# Patient Record
Sex: MALE | Race: WHITE | NOT HISPANIC OR LATINO | Employment: FULL TIME | URBAN - METROPOLITAN AREA
[De-identification: names, ages, dates, MRNs, and addresses within clinical notes are randomized per-mention and may not be internally consistent; named-entity substitution may affect disease eponyms.]

---

## 2021-05-26 ENCOUNTER — RECORDS - HEALTHEAST (OUTPATIENT)
Dept: ADMINISTRATIVE | Facility: CLINIC | Age: 66
End: 2021-05-26

## 2022-02-15 ENCOUNTER — TELEPHONE (OUTPATIENT)
Dept: FAMILY MEDICINE | Facility: CLINIC | Age: 67
End: 2022-02-15

## 2022-02-15 ENCOUNTER — OFFICE VISIT (OUTPATIENT)
Dept: FAMILY MEDICINE | Facility: CLINIC | Age: 67
End: 2022-02-15
Payer: COMMERCIAL

## 2022-02-15 ENCOUNTER — ANCILLARY PROCEDURE (OUTPATIENT)
Dept: GENERAL RADIOLOGY | Facility: CLINIC | Age: 67
End: 2022-02-15
Attending: NURSE PRACTITIONER
Payer: COMMERCIAL

## 2022-02-15 VITALS
RESPIRATION RATE: 16 BRPM | TEMPERATURE: 96.8 F | SYSTOLIC BLOOD PRESSURE: 122 MMHG | BODY MASS INDEX: 23.52 KG/M2 | HEART RATE: 66 BPM | DIASTOLIC BLOOD PRESSURE: 62 MMHG | WEIGHT: 168 LBS | HEIGHT: 71 IN | OXYGEN SATURATION: 100 %

## 2022-02-15 DIAGNOSIS — T14.8XXA SUPERFICIAL FOREIGN BODY (SLIVER): Primary | ICD-10-CM

## 2022-02-15 DIAGNOSIS — T14.8XXA SUPERFICIAL FOREIGN BODY (SLIVER): ICD-10-CM

## 2022-02-15 DIAGNOSIS — S60.459A INFECTED SLIVER OF SKIN OF FINGER, INITIAL ENCOUNTER: ICD-10-CM

## 2022-02-15 DIAGNOSIS — L08.9 INFECTED SLIVER OF SKIN OF FINGER, INITIAL ENCOUNTER: ICD-10-CM

## 2022-02-15 PROCEDURE — 73120 X-RAY EXAM OF HAND: CPT | Mod: LT | Performed by: RADIOLOGY

## 2022-02-15 PROCEDURE — 99203 OFFICE O/P NEW LOW 30 MIN: CPT | Performed by: NURSE PRACTITIONER

## 2022-02-15 RX ORDER — CEPHALEXIN 500 MG/1
500 CAPSULE ORAL 3 TIMES DAILY
Qty: 30 CAPSULE | Refills: 0 | Status: SHIPPED | OUTPATIENT
Start: 2022-02-15 | End: 2022-02-25

## 2022-02-15 ASSESSMENT — MIFFLIN-ST. JEOR: SCORE: 1564.17

## 2022-02-15 ASSESSMENT — PAIN SCALES - GENERAL: PAINLEVEL: NO PAIN (0)

## 2022-02-15 NOTE — PATIENT INSTRUCTIONS
Patient Education     Foreign Object Under the Skin (Not Removed)  Very small particles that remain under the skin usually don t cause problems or need further treatment. But sometimes they can cause an infection. Sometimes they work their way to the surface on their own without any problems. If you see this happening, you can remove any particles with tweezers. Be careful not to dig up the skin and make things worse.   You may need to see a surgeon if the object is large and couldn't be removed.. The surgeon can assess the injury and treat it. Sometimes a surgeon uses X-rays or ultrasound to guide them in removing the object.   Home care  Wound care    Keep the wound clean and dry.    If there is a dressing or bandage, change it when it gets wet or dirty. Otherwise, leave it on for the first 24 hours, then change it once a day or as often as you were instructed.    If stitches or staples were used, clean the wound every day:  ? After taking off the dressing, wash the area gently with soap and water.  ? Apply a thin layer of antibiotic ointment to the cut. This will keep the wound clean and make it easier to remove the stitches. If it is oozing a lot, you can put a nonstick dressing over it. Then reapply the bandage or dressing as you were instructed.  ? You can get it wet, just like when you clean it. This means you can shower as usual for the first 24 hours. But don't soak the area in water (no baths or swimming) until the stitches or staples are taken out.    If surgical tape or strips were used, keep the area clean and dry. If it becomes wet, blot it dry with a towel.  Medicine    You can take over-the-counter medicine for pain, unless you were given a different pain medicine to use. Talk with your healthcare provider before using these medicines if you have chronic liver or kidney disease, ever had a stomach ulcer or digestive bleeding, or are taking blood-thinner medicines.    If you were given antibiotics,  take them until they are used up. It's important to finish the antibiotics even if the wound looks better to make sure the infection clears.    Follow-up care  Follow up your healthcare provider, or as advised. Keep in mind the following:     Watch for any signs of infection, such as increasing pain, redness, swelling, or pus drainage. If this happens, don t wait for your scheduled visit. See your healthcare provider sooner.    Stitches or staples are usually taken out within 5 to 14 days. This varies depending on what part of your body they are on, and the type of wound. The healthcare provider will tell you how long they should be left in.    If surgical tape or strips were used, they are usually left on for 7 to 10 days. You can remove them after that unless you were told otherwise. If you try to remove them, and it is too difficult, soaking can help. If the edges of the cut pull apart, then stop removing the tape, and follow up with your healthcare provider.    If X-rays were taken, you will be told if there are new findings that may affect your care.  When to seek medical advice  Call your healthcare provider right away if any of these occur:     Fever of 100.4 F (38 C) or higher, or as directed by your healthcare provider    Increasing pain in the wound    Redness, swelling or pus coming from the wound  [employer] last reviewed this educational content on 8/1/2019 2000-2021 [employer]. All rights reserved. This information is not intended as a substitute for professional medical care. Always follow your healthcare professional's instructions.

## 2022-02-15 NOTE — TELEPHONE ENCOUNTER
Pt has had a sliver in his hand since Saturday, has tried soaking it, cannot get sliver out. Area does not look infected per pt. Clinic appt made for this afternoon. Greer Moreira RN

## 2022-02-15 NOTE — PROGRESS NOTES
"  Assessment & Plan     Superficial foreign body (sliver)  The risks, benefits and treatment options of prescribed medications or other treatments have been discussed with the patient. The patient verbalized their understanding and should call or follow up if no improvement or if they develop further problems.        - cephALEXin (KEFLEX) 500 MG capsule  Dispense: 30 capsule; Refill: 0  - Adult General Surg Referral    Infected sliver of skin of finger, initial encounter    Discussed soaks twice daily.  If not improved - recommend follow up in clinic or General surgery for removal if suspected sliver still present.          See Patient Instructions    No follow-ups on file.    Lucy Brizuela NP  [facility]    Klaudia Shafer is a 66 year old who presents for the following health issues     History of Present Illness       He eats 0-1 servings of fruits and vegetables daily.He consumes 0 sweetened beverage(s) daily.He exercises with enough effort to increase his heart rate 60 or more minutes per day.  He exercises with enough effort to increase his heart rate 5 days per week.   He is taking medications regularly.       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      Objective    /62 (BP Location: Right arm, Patient Position: Chair, Cuff Size: Adult Regular)   Pulse 66   Temp 96.8  F (36  C) (Tympanic)   Resp 16   Ht 1.803 m (5' 11\")   Wt 76.2 kg (168 lb)   SpO2 100%   " BMI 23.43 kg/m    Body mass index is 23.43 kg/m .  Physical Exam   GENERAL: healthy, alert and no distress  SKIN: Left hand outer lateral hand with site of sliver entrance with mild erythema and swelling.  Using tweeze and small blade at site.  Attempt to remove sliver was unsuccessful.

## 2022-03-27 ENCOUNTER — HEALTH MAINTENANCE LETTER (OUTPATIENT)
Age: 67
End: 2022-03-27

## 2022-09-25 ENCOUNTER — HEALTH MAINTENANCE LETTER (OUTPATIENT)
Age: 67
End: 2022-09-25

## 2022-11-17 ENCOUNTER — APPOINTMENT (OUTPATIENT)
Dept: GENERAL RADIOLOGY | Facility: CLINIC | Age: 67
End: 2022-11-17
Attending: PHYSICIAN ASSISTANT
Payer: COMMERCIAL

## 2022-11-17 ENCOUNTER — HOSPITAL ENCOUNTER (EMERGENCY)
Facility: CLINIC | Age: 67
Discharge: HOME | End: 2022-11-17
Attending: PHYSICIAN ASSISTANT | Admitting: PHYSICIAN ASSISTANT
Payer: COMMERCIAL

## 2022-11-17 VITALS
RESPIRATION RATE: 18 BRPM | OXYGEN SATURATION: 98 % | DIASTOLIC BLOOD PRESSURE: 39 MMHG | HEART RATE: 61 BPM | TEMPERATURE: 97.2 F | SYSTOLIC BLOOD PRESSURE: 124 MMHG

## 2022-11-17 DIAGNOSIS — M79.642 PAIN OF LEFT HAND: ICD-10-CM

## 2022-11-17 PROCEDURE — G0463 HOSPITAL OUTPT CLINIC VISIT: HCPCS | Performed by: PHYSICIAN ASSISTANT

## 2022-11-17 PROCEDURE — 73130 X-RAY EXAM OF HAND: CPT | Mod: LT

## 2022-11-17 PROCEDURE — 99213 OFFICE O/P EST LOW 20 MIN: CPT | Performed by: PHYSICIAN ASSISTANT

## 2022-11-17 RX ORDER — PREDNISONE 20 MG/1
TABLET ORAL
Qty: 10 TABLET | Refills: 0 | Status: SHIPPED | OUTPATIENT
Start: 2022-11-17 | End: 2023-04-25

## 2022-11-17 RX ORDER — CEPHALEXIN 500 MG/1
500 CAPSULE ORAL 4 TIMES DAILY
Qty: 28 CAPSULE | Refills: 0 | Status: SHIPPED | OUTPATIENT
Start: 2022-11-17 | End: 2022-11-24

## 2022-11-17 ASSESSMENT — ENCOUNTER SYMPTOMS
ARTHRALGIAS: 1
NEUROLOGICAL NEGATIVE: 1
JOINT SWELLING: 0

## 2022-11-17 NOTE — ED TRIAGE NOTES
Pt reports falling yesterday on landing on left hand causing thumb pain. States did not hit head

## 2022-11-17 NOTE — ED PROVIDER NOTES
History     Chief Complaint   Patient presents with     Hand Injury     HPI  Hollis Chapin is a 67 year old male who presents for evaluation of pain in the left hand, specifically at the base of the left thumb after a fall which occurred yesterday.  States that he was carrying some bagged items and slipped, breaking his fall with an outstretched left hand.  States that his left thumb had primarily hit the ground after which she developed pain in the first MCP joint.  He has been icing the affected area last night and this morning with limited relief of pain.  Hurts when moving his thumb especially in the MCP joint.  Has no reduction in strength or sensation in the left hand.  He denies any other trauma such as to the head or neck, left upper extremity, right upper extremity or lower extremities.  He is concerned that he may have broken a bone in his left hand. No history of gout or joint infections.    Allergies:  No Known Allergies    Problem List:    Patient Active Problem List    Diagnosis Date Noted     Wrist Sprain      Priority: Medium     Created by Conversion  Replacement Utility updated for latest IMO load         Abdominal Pain      Priority: Medium     Created by Conversion  [facility] Annotation: Apr 17 2013  4:15PM - Nain May: Left   upper  Replacement Utility updated for latest IMO load         Diverticulosis      Priority: Medium     Created by Conversion  Replacement Utility updated for latest IMO load            Past Medical History:    No past medical history on file.    Past Surgical History:    Past Surgical History:   Procedure Laterality Date     HC REMOVE TONSILS/ADENOIDS,<11 Y/O      Description: Tonsillectomy With Adenoidectomy;  Recorded: 05/20/2010;     LA LAP,INGUINAL HERNIA REPR,INITIAL      Description: Laparoscopy Repair Of Initial Inguinal Hernia;  Recorded: 05/20/2010;       Family History:    No family history on file.    Social History:  Marital Status:    [2]  Social History     Tobacco Use     Smoking status: Former     Smokeless tobacco: Never   Vaping Use     Vaping Use: Never used   Substance Use Topics     Alcohol use: Not Currently     Drug use: Not Currently        Medications:    cephALEXin (KEFLEX) 500 MG capsule  predniSONE (DELTASONE) 20 MG tablet          Review of Systems   Musculoskeletal: Positive for arthralgias. Negative for joint swelling.   Skin: Negative.    Neurological: Negative.        Physical Exam   BP: (!) 124/39  Pulse: 61  Temp: 97.2  F (36.2  C)  Resp: 18  SpO2: 98 %      Physical Exam  Constitutional:       General: He is not in acute distress.     Appearance: Normal appearance. He is not ill-appearing, toxic-appearing or diaphoretic.   Cardiovascular:      Pulses: Normal pulses.           Radial pulses are 2+ on the left side.   Musculoskeletal:         General: No swelling.      Left hand: Tenderness present. No swelling, deformity, lacerations or bony tenderness. Decreased range of motion. Normal strength. Normal sensation. Normal capillary refill. Normal pulse.      Cervical back: No muscular tenderness.      Comments: Decreased range of motion and opposition as well as in full extension and abduction likely due to pain and mild swelling in the first MCP joints, left hand.  Mild tenderness to palpation over the dorsal aspect of the first MCP joints, left hand.   Skin:     General: Skin is warm and dry.      Findings: Erythema present. No laceration, lesion or rash.      Comments: Dorsal aspect of the left hand over the MCP joint is mildly erythematous and mildly warm to touch.   Neurological:      Mental Status: He is alert and oriented to person, place, and time.      Sensory: No sensory deficit.         ED Course                 Procedures                  No results found for this or any previous visit (from the past 24 hour(s)).    Medications - No data to display    Assessments & Plan (with Medical Decision Making)     X-rays of  the left hand showed no evidence for acute fracture or dislocation, no degenerative changes.    Differential diagnosis includes fracture versus gout versus osteoarthritis flare versus localized infection.  However, the patient has no fractures or degenerative changes on x-ray.  No history of gout.    The patient's left hand has some redness and warmth over the MCP joint.  I am concerned about the potential for a localized infection today versus localized inflammation.  Given concern for localized infection, starting oral Keflex today.  Provided him with a short course of prednisone as well due to concern for localized inflammation.  He has no red streaks progressing up into the left forearm or fevers to suggest a worsening infection today.  Appropriate for oral antibiotics.    Recommend avoiding ibuprofen while taking prednisone due to concern for excess GI irritation.  Take prednisone with food.    Discussed return precautions including worsening signs and symptoms that would warrant immediate medical evaluation.    You should see improvement in symptoms in 24 to 48 hours after starting antibiotic. Return to clinic if symptoms worsen or persist for more than 48 hours.     Recommend taking a probiotic at the same time you are on antibiotic. Probiotic supports and maintains digestive health while taking antibiotic.      I have reviewed the nursing notes.    I have reviewed the findings, diagnosis, plan and need for follow up with the patient.      Discharge Medication List as of 11/17/2022  2:41 PM      START taking these medications    Details   cephALEXin (KEFLEX) 500 MG capsule Take 1 capsule (500 mg) by mouth 4 times daily for 7 days, Disp-28 capsule, R-0, E-Prescribe      predniSONE (DELTASONE) 20 MG tablet Take two tablets (= 40mg) each day for 5 (five) days, Disp-10 tablet, R-0, E-Prescribe             Final diagnoses:   Pain of left hand       11/17/2022   [facility] EMERGENCY DEPT      Erickson Saeed PA-C  11/20/22 0835

## 2022-11-17 NOTE — DISCHARGE INSTRUCTIONS
Recommend avoiding ibuprofen while taking prednisone due to concern for excess GI irritation.  Take prednisone with food.    Return to clinic for further evaluation and 3 days if you have no improvement.    Recommend urgent medical evaluation if you develop worsening pain, pain out of proportion to injury, numbness or tingling or loss of feeling in the left hand, worsening redness/tenderness/swelling or red streaks progressing up into the left forearm.    You should see improvement in symptoms in 24 to 48 hours after starting antibiotic. Return to clinic if symptoms worsen or persist for more than 48 hours.     Recommend taking a probiotic at the same time you are on antibiotic. Probiotic supports and maintains digestive health while taking antibiotic.

## 2023-04-25 ENCOUNTER — OFFICE VISIT (OUTPATIENT)
Dept: FAMILY MEDICINE | Facility: CLINIC | Age: 68
End: 2023-04-25
Payer: COMMERCIAL

## 2023-04-25 ENCOUNTER — NURSE TRIAGE (OUTPATIENT)
Dept: NURSING | Facility: CLINIC | Age: 68
End: 2023-04-25

## 2023-04-25 VITALS
HEIGHT: 71 IN | RESPIRATION RATE: 16 BRPM | DIASTOLIC BLOOD PRESSURE: 65 MMHG | TEMPERATURE: 98.8 F | OXYGEN SATURATION: 96 % | SYSTOLIC BLOOD PRESSURE: 121 MMHG | HEART RATE: 66 BPM | WEIGHT: 177.6 LBS | BODY MASS INDEX: 24.86 KG/M2

## 2023-04-25 DIAGNOSIS — R51.9 ACUTE NONINTRACTABLE HEADACHE, UNSPECIFIED HEADACHE TYPE: Primary | ICD-10-CM

## 2023-04-25 LAB
ALBUMIN SERPL BCG-MCNC: 4.4 G/DL (ref 3.5–5.2)
ALP SERPL-CCNC: 69 U/L (ref 40–129)
ALT SERPL W P-5'-P-CCNC: 19 U/L (ref 10–50)
ANION GAP SERPL CALCULATED.3IONS-SCNC: 10 MMOL/L (ref 7–15)
AST SERPL W P-5'-P-CCNC: 25 U/L (ref 10–50)
BILIRUB DIRECT SERPL-MCNC: <0.2 MG/DL (ref 0–0.3)
BILIRUB SERPL-MCNC: 0.3 MG/DL
BUN SERPL-MCNC: 17.6 MG/DL (ref 8–23)
CALCIUM SERPL-MCNC: 9.6 MG/DL (ref 8.8–10.2)
CHLORIDE SERPL-SCNC: 104 MMOL/L (ref 98–107)
CREAT SERPL-MCNC: 1.05 MG/DL (ref 0.67–1.17)
CRP SERPL-MCNC: <3 MG/L
DEPRECATED HCO3 PLAS-SCNC: 26 MMOL/L (ref 22–29)
ERYTHROCYTE [DISTWIDTH] IN BLOOD BY AUTOMATED COUNT: 11.9 % (ref 10–15)
ERYTHROCYTE [SEDIMENTATION RATE] IN BLOOD BY WESTERGREN METHOD: 7 MM/HR (ref 0–20)
GFR SERPL CREATININE-BSD FRML MDRD: 78 ML/MIN/1.73M2
GLUCOSE SERPL-MCNC: 89 MG/DL (ref 70–99)
HCT VFR BLD AUTO: 41.8 % (ref 40–53)
HGB BLD-MCNC: 14.4 G/DL (ref 13.3–17.7)
MCH RBC QN AUTO: 34.2 PG (ref 26.5–33)
MCHC RBC AUTO-ENTMCNC: 34.4 G/DL (ref 31.5–36.5)
MCV RBC AUTO: 99 FL (ref 78–100)
PLATELET # BLD AUTO: 242 10E3/UL (ref 150–450)
POTASSIUM SERPL-SCNC: 4.1 MMOL/L (ref 3.4–5.3)
PROT SERPL-MCNC: 7.3 G/DL (ref 6.4–8.3)
RBC # BLD AUTO: 4.21 10E6/UL (ref 4.4–5.9)
SODIUM SERPL-SCNC: 140 MMOL/L (ref 136–145)
WBC # BLD AUTO: 6.1 10E3/UL (ref 4–11)

## 2023-04-25 PROCEDURE — 82248 BILIRUBIN DIRECT: CPT | Performed by: FAMILY MEDICINE

## 2023-04-25 PROCEDURE — 99214 OFFICE O/P EST MOD 30 MIN: CPT | Performed by: FAMILY MEDICINE

## 2023-04-25 PROCEDURE — 85027 COMPLETE CBC AUTOMATED: CPT | Performed by: FAMILY MEDICINE

## 2023-04-25 PROCEDURE — 36415 COLL VENOUS BLD VENIPUNCTURE: CPT | Performed by: FAMILY MEDICINE

## 2023-04-25 PROCEDURE — 85652 RBC SED RATE AUTOMATED: CPT | Performed by: FAMILY MEDICINE

## 2023-04-25 PROCEDURE — 86140 C-REACTIVE PROTEIN: CPT | Performed by: FAMILY MEDICINE

## 2023-04-25 PROCEDURE — 80053 COMPREHEN METABOLIC PANEL: CPT | Performed by: FAMILY MEDICINE

## 2023-04-25 RX ORDER — IBUPROFEN 600 MG/1
600 TABLET, FILM COATED ORAL EVERY 6 HOURS PRN
COMMUNITY
Start: 2022-12-12

## 2023-04-25 NOTE — PATIENT INSTRUCTIONS
Hollis,    We will check laboratory testing as we discussed  I recommend that you set up the CT scan of the head  Please let me know if you develop an unusual rash on your forehead or if symptoms worsen  If symptoms become severe go to the emergency department    Truong Salvador MD

## 2023-04-25 NOTE — PROGRESS NOTES
"  Assessment & Plan     Acute nonintractable headache, unspecified headache type    Tension headache versus migraine variant versus other    Given the severity and persistence of his headache will refer for a CT scan for further evaluation  Check laboratory testing including looking for inflammatory markers given the possibility of temporal arteritis  Recommend keeping a headache diary  Reviewed symptomatic treatment  He will be treated with a prednisone taper  Recommend follow-up if having persistent headaches  Reviewed warning signs recommend immediate follow-up if his symptoms should worsen    - CT Head w/o Contrast  - Basic metabolic panel  - CBC with platelets  - ESR: Erythrocyte sedimentation rate  - CRP inflammation  - Hepatic function panel  - Basic metabolic panel  - CBC with platelets  - ESR: Erythrocyte sedimentation rate  - CRP inflammation  - Hepatic function panel  - predniSONE (DELTASONE) 20 MG tablet  Dispense: 14 tablet; Refill: 0    Spent 30 minutes ankle time for chart preparation and review as well as with patient and in reviewing the plan    Review of external notes as documented elsewhere in note         BMI:   Estimated body mass index is 25.12 kg/m  as calculated from the following:    Height as of this encounter: 1.791 m (5' 10.5\").    Weight as of this encounter: 80.6 kg (177 lb 9.6 oz).           Truong Salvador MD  [facility]    Klaudia Shafer is a 67 year old, presenting for the following health issues:  head pain (C/o right sided face/head pain that also goes into eye. Started Saturday )    This is a 67-year-old male who presents to the clinic with a persistent headache.  He has had a headache over the past week that is more prominent on the right side of his head. This has localized tot he area behind his right eye. He denies visual changes or nausea. He has not had recent trauma. Denies an obvious infection and he has not had nasal congestion or " "post nasal drainage. He has had some discomfort when looking down. He is concerned as he will be traveling to [place] later this week.     Review of systems is negative for neck pain, chest pain, or shortness of breath.       Additional Questions   Roomed by Ana Lilia BRUNSON CMA     History of Present Illness       Headaches:   Since the patient's last clinic visit, headaches are: no change  The patient is getting headaches:  Usually none  He is able to do normal daily activities when he has a migraine.  The patient is taking the following rescue/relief medications:  Ibuprofen (Advil, Motrin)   Patient states \"I get some relief\" from the rescue/relief medications.   The patient is taking the following medications to prevent migraines:  No medications to prevent migraines  In the past 4 weeks, the patient has gone to an Urgent Care or Emergency Room 0 times times due to headaches.    He eats 0-1 servings of fruits and vegetables daily.He consumes 0 sweetened beverage(s) daily.He exercises with enough effort to increase his heart rate 60 or more minutes per day.  He exercises with enough effort to increase his heart rate 5 days per week.   He is taking medications regularly.               Review of Systems   Constitutional, HEENT, cardiovascular, pulmonary, GI, , musculoskeletal, neuro, skin, endocrine and psych systems are negative, except as otherwise noted.      Objective    /65 (BP Location: Left arm, Patient Position: Sitting, Cuff Size: Adult Large)   Pulse 66   Temp 98.8  F (37.1  C) (Oral)   Resp 16   Ht 1.791 m (5' 10.5\")   Wt 80.6 kg (177 lb 9.6 oz)   SpO2 96%   BMI 25.12 kg/m    Body mass index is 25.12 kg/m .  Physical Exam   GENERAL: healthy, alert and no distress  EYES: Eyes grossly normal to inspection, PERRL and conjunctivae and sclerae normal  HENT: ear canals and TM's normal, nose and mouth without ulcers or lesions  NECK: no adenopathy, no asymmetry, masses, or scars and thyroid normal to " palpation  RESP: lungs clear to auscultation - no rales, rhonchi or wheezes  CV: regular rate and rhythm, normal S1 S2, no S3 or S4, no murmur, click or rub, no peripheral edema and peripheral pulses strong  MS: no gross musculoskeletal defects noted, no edema  SKIN: no suspicious lesions or rashes  NEURO: Normal strength and tone, mentation intact and speech normal  PSYCH: mentation appears normal, affect normal/bright

## 2023-04-25 NOTE — TELEPHONE ENCOUNTER
"Pt calling re: headache.    Says he has had a HA since Saturday. Doesn't typically get headaches. No recent head injury.    States that it's on right side of his head and down by his eye feels \"tingly\" when he touches it. Says his R eye feels like like theres something in it and feels \"tired\", but denies any vision changes. Says movement of facial muscles is normal. Says its a constant 2/10, but when bad a 5/10. Took ibuprofen yesterday (800mg) which helped a little, but still has the HA today.     Protocol recommends see in office today. Pt has appt at 4:20 with PCP. Will plan to keep appt and call back if anything worsens. Pt verbalized understanding.    Gillian Costello RN, BSN  [facility]   Triage Nurse Advisor          Reason for Disposition    Patient wants to be seen    Additional Information    Negative: Difficult to awaken or acting confused (e.g., disoriented, slurred speech)    Negative: Weakness of the face, arm or leg on one side of the body and new-onset    Negative: Numbness of the face, arm or leg on one side of the body and new-onset    Negative: Loss of speech or garbled speech and new-onset    Negative: Passed out (i.e., fainted, collapsed and was not responding)    Negative: Sounds like a life-threatening emergency to the triager    Negative: Followed a head injury within last 3 days    Negative: Traumatic Brain Injury (TBI) is suspected    Negative: Sinus pain of forehead and yellow or green nasal discharge    Negative: Pregnant    Negative: Unable to walk without falling    Negative: Stiff neck (can't touch chin to chest)    Negative: SEVERE headache, states 'worst headache' of life    Negative: SEVERE headache, sudden-onset (i.e., reaching maximum intensity within seconds to 1 hour)    Negative: Severe pain in one eye    Negative: Loss of vision or double vision  (Exception: Same as prior migraines.)    Negative: Patient sounds very sick or weak to the triager    Negative: Fever > 103 F " (39.4 C)    Negative: Fever > 100.0 F (37.8 C) and has diabetes mellitus or a weak immune system (e.g., HIV positive, cancer chemotherapy, organ transplant, splenectomy, chronic steroids)    Negative: SEVERE headache (e.g., excruciating) and has had severe headaches before    Negative: SEVERE headache and not relieved by pain meds    Negative: SEVERE headache and vomiting    Negative: SEVERE headache and fever    Negative: Possibility of carbon monoxide exposure    Negative: New headache and weak immune system (e.g., HIV positive, cancer chemo, splenectomy, organ transplant, chronic steroids)    Negative: Fever present > 3 days (72 hours)    Protocols used: HEADACHE-A-[place]

## 2023-04-26 ENCOUNTER — TELEPHONE (OUTPATIENT)
Dept: FAMILY MEDICINE | Facility: CLINIC | Age: 68
End: 2023-04-26
Payer: COMMERCIAL

## 2023-04-26 ENCOUNTER — HOSPITAL ENCOUNTER (OUTPATIENT)
Dept: CT IMAGING | Facility: CLINIC | Age: 68
Discharge: HOME | End: 2023-04-26
Attending: FAMILY MEDICINE | Admitting: FAMILY MEDICINE
Payer: COMMERCIAL

## 2023-04-26 DIAGNOSIS — R51.9 ACUTE NONINTRACTABLE HEADACHE, UNSPECIFIED HEADACHE TYPE: ICD-10-CM

## 2023-04-26 PROCEDURE — 70450 CT HEAD/BRAIN W/O DYE: CPT

## 2023-04-26 RX ORDER — PREDNISONE 20 MG/1
TABLET ORAL
Qty: 14 TABLET | Refills: 0 | Status: SHIPPED | OUTPATIENT
Start: 2023-04-26

## 2023-04-26 NOTE — TELEPHONE ENCOUNTER
Hollis is calling to follow up regarding CT results. Scan done today. As of 3:44pm - note has not been dictated yet. Please call when you review. Thanks ANA PAULA!

## 2023-04-26 NOTE — TELEPHONE ENCOUNTER
I called and reviewed with patient.  His CT scan was unremarkable.  Laboratory testing also was normal.  We discussed options.  He will be treated with a course of prednisone.  Recommend follow-up with his eye doctor as well.  Patient agrees to plan.

## 2023-05-13 ENCOUNTER — HEALTH MAINTENANCE LETTER (OUTPATIENT)
Age: 68
End: 2023-05-13

## 2024-02-21 ENCOUNTER — HOSPITAL ENCOUNTER (EMERGENCY)
Facility: CLINIC | Age: 69
Discharge: HOME | End: 2024-02-21
Attending: PHYSICIAN ASSISTANT | Admitting: PHYSICIAN ASSISTANT
Payer: COMMERCIAL

## 2024-02-21 ENCOUNTER — APPOINTMENT (OUTPATIENT)
Dept: GENERAL RADIOLOGY | Facility: CLINIC | Age: 69
End: 2024-02-21
Attending: PHYSICIAN ASSISTANT
Payer: COMMERCIAL

## 2024-02-21 VITALS
DIASTOLIC BLOOD PRESSURE: 45 MMHG | TEMPERATURE: 98 F | SYSTOLIC BLOOD PRESSURE: 134 MMHG | OXYGEN SATURATION: 99 % | RESPIRATION RATE: 22 BRPM | HEART RATE: 53 BPM

## 2024-02-21 DIAGNOSIS — M79.641 PAIN OF RIGHT HAND: ICD-10-CM

## 2024-02-21 PROCEDURE — 73130 X-RAY EXAM OF HAND: CPT | Mod: RT

## 2024-02-21 PROCEDURE — 99213 OFFICE O/P EST LOW 20 MIN: CPT | Performed by: PHYSICIAN ASSISTANT

## 2024-02-21 PROCEDURE — G0463 HOSPITAL OUTPT CLINIC VISIT: HCPCS | Performed by: PHYSICIAN ASSISTANT

## 2024-02-21 ASSESSMENT — ENCOUNTER SYMPTOMS: CONSTITUTIONAL NEGATIVE: 1

## 2024-02-21 NOTE — ED PROVIDER NOTES
History     Chief Complaint   Patient presents with    Hand Pain     Right hand pain x's 1 week , patient states he hit his thumb with a hammer but is not sure if that is the cause of the pain.      HPI  Hollis Chapin is a 68 year old male who presents to Urgent Care with complaints of right hand pain for the past week.  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  He states he accidentally hit his right thumb with his hammer which initially caused the skin of the thumb to change colors however his thumb is not bothering him anymore.  He states he subsequently developed swelling and pain to his dorsal right hand that was quite significant over the weekend.  The swelling has since come down but patient continues to have pain throughout the dorsal right hand.  No overlying skin changes or redness.  Denies fevers or chills.  Patient is left-hand dominant.      Allergies:  No Known Allergies    Problem List:    Patient Active Problem List    Diagnosis Date Noted    Wrist Sprain      Priority: Medium     Created by Conversion  Replacement Utility updated for latest IMO load        Abdominal Pain      Priority: Medium     Created by [employer]  [facility] Annotation: Apr 17 2013  4:15PM - Nain May: Left   upper  Replacement Utility updated for latest IMO load        Diverticulosis      Priority: Medium     Created by Conversion  Replacement Utility updated for latest IMO load            Past Medical History:    No past medical history on file.    Past Surgical History:    Past Surgical History:   Procedure Laterality Date    HC REMOVE TONSILS/ADENOIDS,<13 Y/O      Description: Tonsillectomy With Adenoidectomy;  Recorded: 05/20/2010;    DE LAP,INGUINAL HERNIA REPR,INITIAL      Description: Laparoscopy Repair Of Initial Inguinal Hernia;  Recorded: 05/20/2010;       Family History:    No family history on  file.    Social History:  Marital Status:   [2]  Social History     Tobacco Use    Smoking status: Former    Smokeless tobacco: Never   Vaping Use    Vaping Use: Never used   Substance Use Topics    Alcohol use: Not Currently    Drug use: Not Currently        Medications:    ibuprofen (ADVIL/MOTRIN) 600 MG tablet  Multiple Vitamins-Minerals (MULTIVITAMIN ADULTS PO)  predniSONE (DELTASONE) 20 MG tablet          Review of Systems   Constitutional: Negative.    Musculoskeletal:         Right hand pain and swelling   Skin: Negative.    All other systems reviewed and are negative.      Physical Exam   BP: 134/45  Pulse: 53  Temp: 98  F (36.7  C)  Resp: 22  SpO2: 99 %      Physical Exam  Constitutional:       General: He is not in acute distress.     Appearance: Normal appearance. He is well-developed. He is not ill-appearing, toxic-appearing or diaphoretic.   HENT:      Head: Normocephalic and atraumatic.   Eyes:      Conjunctiva/sclera: Conjunctivae normal.   Cardiovascular:      Pulses: Normal pulses.   Pulmonary:      Effort: Pulmonary effort is normal.   Musculoskeletal:         General: Normal range of motion.      Right wrist: Normal. No swelling or bony tenderness. Normal range of motion.      Right hand: Swelling, tenderness and bony tenderness present. Normal range of motion. Normal strength. Normal sensation. There is no disruption of two-point discrimination. Normal capillary refill. Normal pulse.        Hands:       Cervical back: Neck supple.      Comments: Localized dorsal right hand tenderness.  No overlying skin changes or erythema.  Full range of motion of fingers and wrist without difficulties.  Small amount of localized swelling to dorsal right hand.  No breaks in the skin.   Skin:     General: Skin is warm and dry.      Capillary Refill: Capillary refill takes less than 2 seconds.   Neurological:      General: No focal deficit present.      Mental Status: He is alert.      Sensory: Sensation is  intact.      Motor: Motor function is intact.         ED Course                 Procedures      Results for orders placed or performed during the hospital encounter of 02/21/24 (from the past 24 hour(s))   XR Hand Right G/E 3 Views    Narrative    EXAM: XR HAND RIGHT G/E 3 VIEWS  DATE/TIME: 2/21/2024 12:48 PM    INDICATION: right dorsal hand pain and swelling  COMPARISON: None available.       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 No acute fracture. Retained metallic foreign body at the volar  aspect of the middle third of the fifth middle phalanx.       NABOR MCKEON DO         SYSTEM ID:  [id number]       Medications - No data to display    Assessments & Plan (with Medical Decision Making)     Pt is a 68 year old male who presents to Urgent Care with complaints of right hand pain for the past week.  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  He states he accidentally hit his right thumb with his hammer which initially caused the skin of the thumb to change colors however his thumb is not bothering him anymore.  He states he subsequently developed swelling and pain to his dorsal right hand that was quite significant over the weekend.  The swelling has since come down but patient continues to have pain throughout the dorsal right hand.      Pt is afebrile on arrival.  Exam as above.  X-rays of right hand show mild degenerative arthrosis.  No fracture or malalignment.  Discussed results with patient.  Encouraged symptomatic treatments at home of suspected overuse/tendinitis.  Return precautions were reviewed.  Hand-outs were provided.    Patient was instructed to follow-up with orthopedics as needed for continued care and management.  He is to return to the ED for persistent and/or worsening symptoms.  Patient expressed understanding of the diagnosis and  plan and was discharged home in good condition.    I have reviewed the nursing notes.    I have reviewed the findings, diagnosis, plan and need for follow up with the patient.    New Prescriptions    No medications on file       Final diagnoses:   Pain of right hand       2/21/2024   [facility] EMERGENCY DEPT      Disclaimer:  This note consists of symbols derived from keyboarding, dictation and/or voice recognition software.  As a result, there may be errors in the script that have gone undetected.  Please consider this when interpreting information found in this chart.     Lavonne Gonzalez PA-C  02/21/24 1448

## 2024-07-20 ENCOUNTER — HEALTH MAINTENANCE LETTER (OUTPATIENT)
Age: 69
End: 2024-07-20

## 2025-07-14 ENCOUNTER — ANCILLARY PROCEDURE (OUTPATIENT)
Dept: GENERAL RADIOLOGY | Facility: CLINIC | Age: 70
End: 2025-07-14
Attending: FAMILY MEDICINE
Payer: COMMERCIAL

## 2025-07-14 ENCOUNTER — OFFICE VISIT (OUTPATIENT)
Dept: FAMILY MEDICINE | Facility: CLINIC | Age: 70
End: 2025-07-14
Payer: COMMERCIAL

## 2025-07-14 VITALS
HEART RATE: 55 BPM | HEIGHT: 70 IN | SYSTOLIC BLOOD PRESSURE: 153 MMHG | DIASTOLIC BLOOD PRESSURE: 59 MMHG | OXYGEN SATURATION: 95 % | BODY MASS INDEX: 24.17 KG/M2 | RESPIRATION RATE: 16 BRPM | WEIGHT: 168.8 LBS

## 2025-07-14 DIAGNOSIS — G89.29 CHRONIC LEFT SHOULDER PAIN: ICD-10-CM

## 2025-07-14 DIAGNOSIS — G89.29 CHRONIC LEFT SHOULDER PAIN: Primary | ICD-10-CM

## 2025-07-14 DIAGNOSIS — M25.512 CHRONIC LEFT SHOULDER PAIN: ICD-10-CM

## 2025-07-14 DIAGNOSIS — M54.12 CERVICAL RADICULOPATHY: ICD-10-CM

## 2025-07-14 DIAGNOSIS — M25.512 CHRONIC LEFT SHOULDER PAIN: Primary | ICD-10-CM

## 2025-07-14 DIAGNOSIS — M79.622 PAIN OF LEFT UPPER ARM: ICD-10-CM

## 2025-07-14 PROCEDURE — 73030 X-RAY EXAM OF SHOULDER: CPT | Mod: TC | Performed by: RADIOLOGY

## 2025-07-14 PROCEDURE — 3077F SYST BP >= 140 MM HG: CPT | Performed by: FAMILY MEDICINE

## 2025-07-14 PROCEDURE — 3078F DIAST BP <80 MM HG: CPT | Performed by: FAMILY MEDICINE

## 2025-07-14 PROCEDURE — 99214 OFFICE O/P EST MOD 30 MIN: CPT | Performed by: FAMILY MEDICINE

## 2025-07-14 NOTE — PROGRESS NOTES
Assessment & Plan     ICD-10-CM    1. Chronic left shoulder pain  M25.512 XR Shoulder Left G/E 3 Views    G89.29       2. Cervical radiculopathy  M54.12 EMG      3. Pain of left upper arm  M79.622         Patient presents today for pain of the left upper arm.  He describes a 6-week sense of pain coming from the shoulders to the upper arm.  Does not go to the fingers.  Exam shows tenderness of the trapezius.  Shoulder joint is also tender anteriorly.  Denies any chest pain, shortness of breath or decline in activity or angina a:.  Check x-ray of shoulder for arthritis and any EMG for cervical radiculopathy.  Printed educational materials provided.  Red flag signs discussed to seek help.  Encouraged to schedule appointment with PCP for routine physical visit.      Klaudia Shafer is a 69 year old, presenting for the following health issues:  Arm Problem (Left arm numbness and achey, X1 month or so. No injury)        7/14/2025     4:04 PM   Additional Questions   Roomed by Ramandeep Rivera CMA   Accompanied by Spouse- Arvind     History of Present Illness       Reason for visit:  Sore arm  Symptom onset:  More than a month  Symptoms include:  Pain  Symptom intensity:  Moderate  Symptom progression:  Worsening  Had these symptoms before:  No  What makes it worse:  Using it  What makes it better:  Rest   He is taking medications regularly.        Patient Active Problem List   Diagnosis    Wrist Sprain    Abdominal Pain    Diverticulosis     Current Outpatient Medications   Medication Sig Dispense Refill    ibuprofen (ADVIL/MOTRIN) 600 MG tablet Take 600 mg by mouth every 6 hours as needed      Multiple Vitamins-Minerals (MULTIVITAMIN ADULTS PO)        No current facility-administered medications for this visit.           Review of Systems  Constitutional, neuro, ENT, endocrine, pulmonary, cardiac, gastrointestinal, genitourinary, musculoskeletal, integument and psychiatric systems are negative, except as otherwise  "noted.      Objective    BP (!) 153/59 (BP Location: Left arm, Patient Position: Sitting, Cuff Size: Adult Regular)   Pulse 55   Resp 16   Ht 1.778 m (5' 10\")   Wt 76.6 kg (168 lb 12.8 oz)   SpO2 95%   BMI 24.22 kg/m    Body mass index is 24.22 kg/m .  Physical Exam   GENERAL: alert and no distress  NECK: no adenopathy, no asymmetry, masses, or scars  RESP: lungs clear to auscultation - no rales, rhonchi or wheezes  CV: regular rate and rhythm, normal S1 S2, no S3 or S4, no murmur, click or rub, no peripheral edema  ABDOMEN: soft, nontender, no hepatosplenomegaly, no masses and bowel sounds normal  MS: Diffuse tenderness over the left trapezius area.  No point tenderness of cervical spine.  Back scratch test and impingement signs are negative.  Shoulder is tender anteriorly.  Strength is normal in both upper extremity.            Signed Electronically by: Sanford Garcia MD    "

## 2025-07-15 ENCOUNTER — TELEPHONE (OUTPATIENT)
Dept: FAMILY MEDICINE | Facility: CLINIC | Age: 70
End: 2025-07-15
Payer: COMMERCIAL

## 2025-07-15 NOTE — TELEPHONE ENCOUNTER
Patient Quality Outreach    Patient is due for the following:   Physical Annual Wellness Visit    Action(s) Taken:   Schedule a Annual Wellness Visit    Type of outreach:    Sent [employer] message.    Questions for provider review:    None         Marilu Salazar MA  Chart routed to None.

## 2025-08-09 ENCOUNTER — HEALTH MAINTENANCE LETTER (OUTPATIENT)
Age: 70
End: 2025-08-09